# Patient Record
Sex: MALE | URBAN - METROPOLITAN AREA
[De-identification: names, ages, dates, MRNs, and addresses within clinical notes are randomized per-mention and may not be internally consistent; named-entity substitution may affect disease eponyms.]

---

## 2020-09-24 ENCOUNTER — APPOINTMENT (RX ONLY)
Dept: URBAN - METROPOLITAN AREA CLINIC 37 | Facility: CLINIC | Age: 70
Setting detail: DERMATOLOGY
End: 2020-09-24

## 2020-09-24 DIAGNOSIS — L81.5 LEUKODERMA, NOT ELSEWHERE CLASSIFIED: ICD-10-CM

## 2020-09-24 DIAGNOSIS — B35.6 TINEA CRURIS: ICD-10-CM

## 2020-09-24 PROCEDURE — ? COUNSELING

## 2020-09-24 PROCEDURE — ? ADDITIONAL NOTES

## 2020-09-24 PROCEDURE — 99202 OFFICE O/P NEW SF 15 MIN: CPT

## 2020-09-24 PROCEDURE — ? PRESCRIPTION

## 2020-09-24 RX ORDER — ECONAZOLE NITRATE 10 MG/G
CREAM TOPICAL
Qty: 1 | Refills: 1 | Status: ERX | COMMUNITY
Start: 2020-09-24

## 2020-09-24 RX ADMIN — ECONAZOLE NITRATE: 10 CREAM TOPICAL at 00:00

## 2020-09-24 ASSESSMENT — LOCATION SIMPLE DESCRIPTION DERM
LOCATION SIMPLE: LEFT UPPER ARM
LOCATION SIMPLE: LEFT THIGH
LOCATION SIMPLE: RIGHT THIGH
LOCATION SIMPLE: RIGHT UPPER ARM

## 2020-09-24 ASSESSMENT — LOCATION DETAILED DESCRIPTION DERM
LOCATION DETAILED: RIGHT ANTERIOR PROXIMAL THIGH
LOCATION DETAILED: LEFT ANTERIOR PROXIMAL THIGH
LOCATION DETAILED: RIGHT DISTAL POSTERIOR UPPER ARM
LOCATION DETAILED: LEFT DISTAL POSTERIOR UPPER ARM

## 2020-09-24 ASSESSMENT — LOCATION ZONE DERM
LOCATION ZONE: ARM
LOCATION ZONE: LEG

## 2020-11-12 ENCOUNTER — APPOINTMENT (RX ONLY)
Dept: URBAN - METROPOLITAN AREA CLINIC 37 | Facility: CLINIC | Age: 70
Setting detail: DERMATOLOGY
End: 2020-11-12

## 2020-11-12 DIAGNOSIS — L29.3 ANOGENITAL PRURITUS, UNSPECIFIED: ICD-10-CM

## 2020-11-12 PROCEDURE — 99213 OFFICE O/P EST LOW 20 MIN: CPT

## 2020-11-12 PROCEDURE — ? PRESCRIPTION

## 2020-11-12 PROCEDURE — ? OTHER

## 2020-11-12 PROCEDURE — ? ADDITIONAL NOTES

## 2020-11-12 PROCEDURE — ? COUNSELING

## 2020-11-12 RX ORDER — HYDROCORTISONE 25 MG/G
CREAM TOPICAL
Qty: 1 | Refills: 1 | Status: ERX | COMMUNITY
Start: 2020-11-12

## 2020-11-12 RX ADMIN — HYDROCORTISONE: 25 CREAM TOPICAL at 00:00

## 2020-11-12 ASSESSMENT — LOCATION SIMPLE DESCRIPTION DERM: LOCATION SIMPLE: SCROTUM

## 2020-11-12 ASSESSMENT — LOCATION ZONE DERM: LOCATION ZONE: GENITALIA

## 2020-11-12 ASSESSMENT — LOCATION DETAILED DESCRIPTION DERM: LOCATION DETAILED: RIGHT ANTERIOR SCROTUM

## 2020-11-12 NOTE — PROCEDURE: OTHER
Other (Free Text): Tinea resolved
Note Text (......Xxx Chief Complaint.): This diagnosis correlates with the
Detail Level: Zone

## 2022-02-24 ENCOUNTER — APPOINTMENT (RX ONLY)
Dept: URBAN - METROPOLITAN AREA CLINIC 37 | Facility: CLINIC | Age: 72
Setting detail: DERMATOLOGY
End: 2022-02-24

## 2022-02-24 DIAGNOSIS — L20.89 OTHER ATOPIC DERMATITIS: ICD-10-CM

## 2022-02-24 DIAGNOSIS — L82.1 OTHER SEBORRHEIC KERATOSIS: ICD-10-CM

## 2022-02-24 PROBLEM — L20.84 INTRINSIC (ALLERGIC) ECZEMA: Status: ACTIVE | Noted: 2022-02-24

## 2022-02-24 PROCEDURE — 99213 OFFICE O/P EST LOW 20 MIN: CPT

## 2022-02-24 PROCEDURE — ? PRESCRIPTION

## 2022-02-24 PROCEDURE — ? COUNSELING

## 2022-02-24 PROCEDURE — ? ADDITIONAL NOTES

## 2022-02-24 RX ORDER — HYDROCORTISONE 25 MG/G
CREAM TOPICAL
Qty: 30 | Refills: 2 | Status: ERX | COMMUNITY
Start: 2022-02-24

## 2022-02-24 RX ADMIN — HYDROCORTISONE: 25 CREAM TOPICAL at 00:00

## 2022-02-24 ASSESSMENT — LOCATION ZONE DERM
LOCATION ZONE: ARM
LOCATION ZONE: LEG

## 2022-02-24 ASSESSMENT — LOCATION DETAILED DESCRIPTION DERM
LOCATION DETAILED: RIGHT POSTERIOR SHOULDER
LOCATION DETAILED: LEFT DISTAL PRETIBIAL REGION

## 2022-02-24 ASSESSMENT — LOCATION SIMPLE DESCRIPTION DERM
LOCATION SIMPLE: RIGHT SHOULDER
LOCATION SIMPLE: LEFT PRETIBIAL REGION

## 2022-03-07 ENCOUNTER — RX ONLY (OUTPATIENT)
Age: 72
Setting detail: RX ONLY
End: 2022-03-07

## 2022-03-07 RX ORDER — HYDROCORTISONE 25 MG/G
CREAM TOPICAL
Qty: 453.6 | Refills: 1 | Status: ERX

## 2024-03-16 NOTE — PROCEDURE: ADDITIONAL NOTES
Detail Level: Simple
Additional Notes: Patient consent was obtained to proceed with the visit and recommended plan of care after discussion of all risks and benefits, including the risks of COVID-19 exposure.
15